# Patient Record
Sex: MALE | Race: WHITE | HISPANIC OR LATINO | URBAN - METROPOLITAN AREA
[De-identification: names, ages, dates, MRNs, and addresses within clinical notes are randomized per-mention and may not be internally consistent; named-entity substitution may affect disease eponyms.]

---

## 2023-04-07 ENCOUNTER — OFFICE VISIT (OUTPATIENT)
Dept: DENTISTRY | Facility: CLINIC | Age: 53
End: 2023-04-07

## 2023-04-07 VITALS — TEMPERATURE: 97.8 F

## 2023-04-07 DIAGNOSIS — K08.89 TOOTH PAIN: Primary | ICD-10-CM

## 2023-04-07 NOTE — DENTAL PROCEDURE DETAILS
"Limited Focus Exam    Hanane Martinez is a 45yo Latin male who presented to [facility] for new patient exam  However, pt was in pain on #15/19  Did a limited exam instead  ASA II  Pain = 5/10 on #15/19    XR:   (2) PAX #15 and 19    #15:  Hx: 1 mo ago was eating and fractured filling  Tooth is hot/cold sensitive per pt report  Not keeping pt up at night  Pain \"comes and goes\"  Not sharp pain  Clinical: no evidence of decay  Missing occlusal filling  Perc (-)  Radiographic: no PARL  No evidence of decay  #19:  Hx: 1 mo ago was eating and fractured filling  Tooth is hot/cold sensitive per pt report  Not keeping pt up at night  Pain \"comes and goes\"  Not sharp pain  Clinical: no evidence of decay  Missing occlusal filling  Perc (-)  Radiographic: no PARL  No evidence of decay  Dg: Reversible pulpitis, normal periapex (caused by fractured occlusal filling #15/19 exposing dentin)    Done Today: Placed gluma followed by [facility] on occlusal #15/19      NV: NP exam   NNV: resins #15-O, #19-O                            "

## 2023-04-24 ENCOUNTER — OFFICE VISIT (OUTPATIENT)
Dept: DENTISTRY | Facility: CLINIC | Age: 53
End: 2023-04-24

## 2023-04-24 VITALS — SYSTOLIC BLOOD PRESSURE: 137 MMHG | DIASTOLIC BLOOD PRESSURE: 80 MMHG | TEMPERATURE: 98.4 F | HEART RATE: 79 BPM

## 2023-04-24 DIAGNOSIS — Z59.9 FINANCIAL DIFFICULTIES: ICD-10-CM

## 2023-04-24 DIAGNOSIS — Z01.20 ENCOUNTER FOR DENTAL EXAMINATION: Primary | ICD-10-CM

## 2023-04-24 RX ORDER — SILDENAFIL 50 MG/1
50 TABLET, FILM COATED ORAL
COMMUNITY
Start: 2022-12-02 | End: 2023-12-02

## 2023-04-24 RX ORDER — ATORVASTATIN CALCIUM 40 MG/1
40 TABLET, FILM COATED ORAL DAILY
COMMUNITY
Start: 2022-12-02 | End: 2023-12-02

## 2023-04-24 SDOH — ECONOMIC STABILITY - INCOME SECURITY: PROBLEM RELATED TO HOUSING AND ECONOMIC CIRCUMSTANCES, UNSPECIFIED: Z59.9

## 2023-04-24 NOTE — PROGRESS NOTES
Subjective   Patient ID: Michael Nieto is a 46 y o  male    Chief Complaint   Patient presents with   • New Patient Visit     Reviewed medical history   ASA  2    Translation used    [facility] [employer]  [id number])  (20 min total with )  Patient presented for comp exam FMX  Sub gingival  and supra calc noted clinically and on radiographs  Discussed periodontal disease with patient   Localized advanced bone loss  ( # 26 especially)  Patient stated he wore braces years ago but does not recall    any trauma to area    Radiographic bone loss noted # 18 D  Irregular radiopacity noted on distal root   Will need to eval if it is calculus or possible enamel edwin or other anomaly  May be damage from # 16     Discussed need for FM debridement then FM probe      Discussed erosion anterior teeth   He is aware he grinds teeth  Does wear NG at night  Discussed rinsing with baking soda if he has bouts of reflux    Has SFS in place    Dr Saralee Hamman exam   Does not think # 18 or # 15 have decay  NV debridement  NV 2  Comp exam FM probe

## 2023-05-08 ENCOUNTER — PATIENT OUTREACH (OUTPATIENT)
Dept: DENTISTRY | Facility: CLINIC | Age: 53
End: 2023-05-08

## 2023-05-08 NOTE — PROGRESS NOTES
SDOH referral received from [address] for financial difficulties  OP SW called patient with [facility]  and left message  OP SW to try again at another time

## 2023-05-19 ENCOUNTER — PATIENT OUTREACH (OUTPATIENT)
Dept: PEDIATRICS CLINIC | Facility: CLINIC | Age: 53
End: 2023-05-19

## 2023-05-19 NOTE — PROGRESS NOTES
SDOH referral received from [address] for financial difficulties       OP SW called patient with [employer] [facility]  and left message  This has been the 2nd attempt to contact patient OP SW to send unable to contact letter

## 2024-01-11 ENCOUNTER — OFFICE VISIT (OUTPATIENT)
Dept: DENTISTRY | Facility: CLINIC | Age: 54
End: 2024-01-11

## 2024-01-11 VITALS — HEART RATE: 74 BPM | SYSTOLIC BLOOD PRESSURE: 123 MMHG | TEMPERATURE: 97.9 F | DIASTOLIC BLOOD PRESSURE: 76 MMHG

## 2024-01-11 DIAGNOSIS — Z01.20 ENCOUNTER FOR DENTAL EXAMINATION: Primary | ICD-10-CM

## 2024-01-11 DIAGNOSIS — K08.89 PAIN, DENTAL: ICD-10-CM

## 2024-01-11 PROCEDURE — D0140 LIMITED ORAL EVALUATION - PROBLEM FOCUSED: HCPCS

## 2024-01-11 PROCEDURE — D0220 INTRAORAL - PERIAPICAL FIRST RADIOGRAPHIC IMAGE: HCPCS | Performed by: DENTAL HYGIENIST

## 2024-01-11 RX ORDER — TADALAFIL 5 MG/1
5 TABLET ORAL DAILY PRN
COMMUNITY
Start: 2023-10-25 | End: 2024-10-24

## 2024-01-11 RX ORDER — NAPROXEN 500 MG/1
500 TABLET ORAL 2 TIMES DAILY WITH MEALS
COMMUNITY

## 2024-01-11 RX ORDER — LIDOCAINE 50 MG/G
PATCH TOPICAL
COMMUNITY

## 2024-01-11 RX ORDER — MINOXIDIL 2.5 MG/1
2.5 TABLET ORAL DAILY
COMMUNITY
Start: 2024-01-11 | End: 2025-01-10

## 2024-01-11 NOTE — DENTAL PROCEDURE DETAILS
Subjective   Patient ID: Ronnie Cook is a 53 y.o. male.  Chief Complaint   Patient presents with    Emergency/limited Exam     Reviewed medical history   ASA I  Pain - 4-5  [facility] [place] translation -  #[id number] - 20 min     CC:  Having constant pain since last Wednesday on LL molar #18.   Did not want debridement today  - wanted to address the pain.    Today:  Limited exam, 1 PA #18    DX:  #18 - large caries below the gumline - non-restorable - 8 mm pockets and Grade 1 mobility -needs ext  ---#17 also has large buccal caries - recommended ext as well  ---Referred to OS for 17, 18 and also to evaluate 1, 16, 32 for ext's  ---Discussed again his need to return for a full mouth debridement to remove the sub and supra calculus as soon as possible    Exam:  Dr. Fields/ [place]  Referral:  OS    NV1:  Debridement - 60 min    Please give OS referral and xrays from printer.